# Patient Record
Sex: MALE | Race: WHITE | Employment: UNEMPLOYED | URBAN - METROPOLITAN AREA
[De-identification: names, ages, dates, MRNs, and addresses within clinical notes are randomized per-mention and may not be internally consistent; named-entity substitution may affect disease eponyms.]

---

## 2019-07-12 RX ORDER — SODIUM CHLORIDE 0.9 % (FLUSH) 0.9 %
5-40 SYRINGE (ML) INJECTION AS NEEDED
Status: CANCELLED | OUTPATIENT
Start: 2019-07-12

## 2019-07-12 RX ORDER — SODIUM CHLORIDE 0.9 % (FLUSH) 0.9 %
5-40 SYRINGE (ML) INJECTION EVERY 8 HOURS
Status: CANCELLED | OUTPATIENT
Start: 2019-07-12

## 2019-07-25 ENCOUNTER — ANESTHESIA EVENT (OUTPATIENT)
Dept: SURGERY | Age: 64
End: 2019-07-25
Payer: MEDICARE

## 2019-07-26 ENCOUNTER — ANESTHESIA (OUTPATIENT)
Dept: SURGERY | Age: 64
End: 2019-07-26
Payer: MEDICARE

## 2019-07-26 ENCOUNTER — HOSPITAL ENCOUNTER (OUTPATIENT)
Age: 64
Setting detail: OUTPATIENT SURGERY
Discharge: HOME | End: 2019-07-26
Attending: ORTHOPAEDIC SURGERY | Admitting: ORTHOPAEDIC SURGERY
Payer: MEDICARE

## 2019-07-26 VITALS
RESPIRATION RATE: 16 BRPM | SYSTOLIC BLOOD PRESSURE: 138 MMHG | TEMPERATURE: 98 F | DIASTOLIC BLOOD PRESSURE: 88 MMHG | HEART RATE: 78 BPM | BODY MASS INDEX: 27.98 KG/M2 | HEIGHT: 74 IN | OXYGEN SATURATION: 96 % | WEIGHT: 218 LBS

## 2019-07-26 LAB — GLUCOSE BLD STRIP.AUTO-MCNC: 186 MG/DL (ref 65–100)

## 2019-07-26 PROCEDURE — 74011250636 HC RX REV CODE- 250/636

## 2019-07-26 PROCEDURE — 74011250637 HC RX REV CODE- 250/637: Performed by: ANESTHESIOLOGY

## 2019-07-26 PROCEDURE — 82962 GLUCOSE BLOOD TEST: CPT

## 2019-07-26 PROCEDURE — 74011000250 HC RX REV CODE- 250

## 2019-07-26 PROCEDURE — 77030006668 HC BLD SHV MENSCS STRY -B: Performed by: ORTHOPAEDIC SURGERY

## 2019-07-26 PROCEDURE — 76060000032 HC ANESTHESIA 0.5 TO 1 HR: Performed by: ORTHOPAEDIC SURGERY

## 2019-07-26 PROCEDURE — 77030018836 HC SOL IRR NACL ICUM -A: Performed by: ORTHOPAEDIC SURGERY

## 2019-07-26 PROCEDURE — 74011250636 HC RX REV CODE- 250/636: Performed by: ANESTHESIOLOGY

## 2019-07-26 PROCEDURE — 76210000020 HC REC RM PH II FIRST 0.5 HR: Performed by: ORTHOPAEDIC SURGERY

## 2019-07-26 PROCEDURE — 76210000063 HC OR PH I REC FIRST 0.5 HR: Performed by: ORTHOPAEDIC SURGERY

## 2019-07-26 PROCEDURE — 74011250636 HC RX REV CODE- 250/636: Performed by: ORTHOPAEDIC SURGERY

## 2019-07-26 PROCEDURE — 76010000138 HC OR TIME 0.5 TO 1 HR: Performed by: ORTHOPAEDIC SURGERY

## 2019-07-26 PROCEDURE — 77030038012 HC WND COBLATN S&N -F: Performed by: ORTHOPAEDIC SURGERY

## 2019-07-26 PROCEDURE — 77030010509 HC AIRWY LMA MSK TELE -A: Performed by: NURSE ANESTHETIST, CERTIFIED REGISTERED

## 2019-07-26 RX ORDER — ALBUTEROL SULFATE 0.83 MG/ML
2.5 SOLUTION RESPIRATORY (INHALATION) AS NEEDED
Status: DISCONTINUED | OUTPATIENT
Start: 2019-07-26 | End: 2019-07-26 | Stop reason: HOSPADM

## 2019-07-26 RX ORDER — HYDROMORPHONE HYDROCHLORIDE 2 MG/ML
0.5 INJECTION, SOLUTION INTRAMUSCULAR; INTRAVENOUS; SUBCUTANEOUS
Status: DISCONTINUED | OUTPATIENT
Start: 2019-07-26 | End: 2019-07-26 | Stop reason: HOSPADM

## 2019-07-26 RX ORDER — PROPOFOL 10 MG/ML
INJECTION, EMULSION INTRAVENOUS AS NEEDED
Status: DISCONTINUED | OUTPATIENT
Start: 2019-07-26 | End: 2019-07-26 | Stop reason: HOSPADM

## 2019-07-26 RX ORDER — FENTANYL CITRATE 50 UG/ML
INJECTION, SOLUTION INTRAMUSCULAR; INTRAVENOUS AS NEEDED
Status: DISCONTINUED | OUTPATIENT
Start: 2019-07-26 | End: 2019-07-26 | Stop reason: HOSPADM

## 2019-07-26 RX ORDER — ONDANSETRON 2 MG/ML
4 INJECTION INTRAMUSCULAR; INTRAVENOUS
Status: DISCONTINUED | OUTPATIENT
Start: 2019-07-26 | End: 2019-07-26 | Stop reason: HOSPADM

## 2019-07-26 RX ORDER — ACETAMINOPHEN 500 MG
1000 TABLET ORAL ONCE
Status: COMPLETED | OUTPATIENT
Start: 2019-07-26 | End: 2019-07-26

## 2019-07-26 RX ORDER — MIDAZOLAM HYDROCHLORIDE 1 MG/ML
2 INJECTION, SOLUTION INTRAMUSCULAR; INTRAVENOUS
Status: DISCONTINUED | OUTPATIENT
Start: 2019-07-26 | End: 2019-07-26 | Stop reason: HOSPADM

## 2019-07-26 RX ORDER — SODIUM CHLORIDE 0.9 % (FLUSH) 0.9 %
5-40 SYRINGE (ML) INJECTION EVERY 8 HOURS
Status: DISCONTINUED | OUTPATIENT
Start: 2019-07-26 | End: 2019-07-26 | Stop reason: HOSPADM

## 2019-07-26 RX ORDER — ROPIVACAINE HYDROCHLORIDE 5 MG/ML
INJECTION, SOLUTION EPIDURAL; INFILTRATION; PERINEURAL AS NEEDED
Status: DISCONTINUED | OUTPATIENT
Start: 2019-07-26 | End: 2019-07-26 | Stop reason: HOSPADM

## 2019-07-26 RX ORDER — SODIUM CHLORIDE, SODIUM LACTATE, POTASSIUM CHLORIDE, CALCIUM CHLORIDE 600; 310; 30; 20 MG/100ML; MG/100ML; MG/100ML; MG/100ML
1000 INJECTION, SOLUTION INTRAVENOUS CONTINUOUS
Status: DISCONTINUED | OUTPATIENT
Start: 2019-07-26 | End: 2019-07-26 | Stop reason: HOSPADM

## 2019-07-26 RX ORDER — ONDANSETRON 2 MG/ML
INJECTION INTRAMUSCULAR; INTRAVENOUS AS NEEDED
Status: DISCONTINUED | OUTPATIENT
Start: 2019-07-26 | End: 2019-07-26 | Stop reason: HOSPADM

## 2019-07-26 RX ORDER — KETOROLAC TROMETHAMINE 30 MG/ML
INJECTION, SOLUTION INTRAMUSCULAR; INTRAVENOUS AS NEEDED
Status: DISCONTINUED | OUTPATIENT
Start: 2019-07-26 | End: 2019-07-26 | Stop reason: HOSPADM

## 2019-07-26 RX ORDER — LIDOCAINE HYDROCHLORIDE 20 MG/ML
INJECTION, SOLUTION EPIDURAL; INFILTRATION; INTRACAUDAL; PERINEURAL AS NEEDED
Status: DISCONTINUED | OUTPATIENT
Start: 2019-07-26 | End: 2019-07-26 | Stop reason: HOSPADM

## 2019-07-26 RX ORDER — LIDOCAINE HYDROCHLORIDE 10 MG/ML
0.1 INJECTION INFILTRATION; PERINEURAL AS NEEDED
Status: DISCONTINUED | OUTPATIENT
Start: 2019-07-26 | End: 2019-07-26 | Stop reason: HOSPADM

## 2019-07-26 RX ORDER — SODIUM CHLORIDE 0.9 % (FLUSH) 0.9 %
5-40 SYRINGE (ML) INJECTION AS NEEDED
Status: DISCONTINUED | OUTPATIENT
Start: 2019-07-26 | End: 2019-07-26 | Stop reason: HOSPADM

## 2019-07-26 RX ORDER — DEXAMETHASONE SODIUM PHOSPHATE 4 MG/ML
INJECTION, SOLUTION INTRA-ARTICULAR; INTRALESIONAL; INTRAMUSCULAR; INTRAVENOUS; SOFT TISSUE AS NEEDED
Status: DISCONTINUED | OUTPATIENT
Start: 2019-07-26 | End: 2019-07-26 | Stop reason: HOSPADM

## 2019-07-26 RX ORDER — EPHEDRINE SULFATE 50 MG/ML
INJECTION, SOLUTION INTRAVENOUS AS NEEDED
Status: DISCONTINUED | OUTPATIENT
Start: 2019-07-26 | End: 2019-07-26 | Stop reason: HOSPADM

## 2019-07-26 RX ADMIN — SODIUM CHLORIDE, SODIUM LACTATE, POTASSIUM CHLORIDE, AND CALCIUM CHLORIDE 1000 ML: 600; 310; 30; 20 INJECTION, SOLUTION INTRAVENOUS at 10:40

## 2019-07-26 RX ADMIN — ACETAMINOPHEN 1000 MG: 500 TABLET, FILM COATED ORAL at 10:44

## 2019-07-26 RX ADMIN — LIDOCAINE HYDROCHLORIDE 100 MG: 20 INJECTION, SOLUTION EPIDURAL; INFILTRATION; INTRACAUDAL; PERINEURAL at 11:47

## 2019-07-26 RX ADMIN — KETOROLAC TROMETHAMINE 30 MG: 30 INJECTION, SOLUTION INTRAMUSCULAR; INTRAVENOUS at 12:10

## 2019-07-26 RX ADMIN — PROPOFOL 200 MG: 10 INJECTION, EMULSION INTRAVENOUS at 11:47

## 2019-07-26 RX ADMIN — DEXAMETHASONE SODIUM PHOSPHATE 4 MG: 4 INJECTION, SOLUTION INTRA-ARTICULAR; INTRALESIONAL; INTRAMUSCULAR; INTRAVENOUS; SOFT TISSUE at 11:56

## 2019-07-26 RX ADMIN — ONDANSETRON 4 MG: 2 INJECTION INTRAMUSCULAR; INTRAVENOUS at 12:05

## 2019-07-26 RX ADMIN — EPHEDRINE SULFATE 10 MG: 50 INJECTION, SOLUTION INTRAVENOUS at 11:50

## 2019-07-26 RX ADMIN — FENTANYL CITRATE 50 MCG: 50 INJECTION, SOLUTION INTRAMUSCULAR; INTRAVENOUS at 11:47

## 2019-07-26 RX ADMIN — FENTANYL CITRATE 50 MCG: 50 INJECTION, SOLUTION INTRAMUSCULAR; INTRAVENOUS at 11:59

## 2019-07-26 NOTE — BRIEF OP NOTE
BRIEF OPERATIVE NOTE    Date of Procedure: 2019   Preoperative Diagnosis: Complex tear of medial meniscus, current injury, left knee, initial encounter [S83.232A]  Postoperative Diagnosis: Complex tear of medial/Lateral meniscus, current injury, left knee, chondromalacia, initial encounter [S83.232A]    Procedure(s):  LEFT KNEE ARTHROSCOPY/ MEDIAL/lATERALMENISCECTOMY/CHONDROPLASTY  Surgeon(s) and Role:      Keyla Mendiola MD - Primary         Surgical Assistant:         Surgical Staff:  Circ-1: Anais SARABIA  Circ-2: Esequiel Edmondson RN  Scrub Tech-1: Herlinda Apgar  Scrub Tech-Relief: Emma Jamil  Event Time In Time Out   Incision Start 1200    Incision Close 1211      Anesthesia: General   Estimated Blood Loss:     Specimens: * No specimens in log *   Findings:      Complications:     Implants: * No implants in log *

## 2019-07-26 NOTE — ANESTHESIA PREPROCEDURE EVALUATION
Relevant Problems   No relevant active problems       Anesthetic History               Review of Systems / Medical History  Patient summary reviewed and pertinent labs reviewed    Pulmonary    COPD      Smoker         Neuro/Psych              Cardiovascular    Hypertension          CAD         GI/Hepatic/Renal     GERD           Endo/Other    Diabetes         Other Findings              Physical Exam    Airway  Mallampati: II  TM Distance: 4 - 6 cm  Neck ROM: normal range of motion   Mouth opening: Normal     Cardiovascular    Rhythm: regular  Rate: normal      Pertinent negatives: No murmur   Dental    Dentition: Full upper dentures     Pulmonary  Breath sounds clear to auscultation               Abdominal         Other Findings            Anesthetic Plan    ASA: 3  Anesthesia type: general          Induction: Intravenous  Anesthetic plan and risks discussed with: Patient      LMA

## 2019-07-26 NOTE — DISCHARGE INSTRUCTIONS
POST OPERATIVE INSTRUCTIONS FOR KNEE ARTHROSCOPY    1. Unless you receive other instructions, you may weight bear as tolerated      2. Apply ice to your knee for 20-30 minutes several times per day for the first 3 days and then as needed. Icing will decrease the amount of inflammation and is helpful after exercise    3. You may remove the dressings the following day and apply waterproof band aids. Some bleeding and drainage may persist for several days following  surgery. Waterproof band aids may be used while showering and avoid tub baths for two weeks. 4. You will be given pain medications and do not drive under the influence. Some patients take pain medication at night and antiinflammatory medication during day  when pain decreases. 5. You may be given Phenergan for nausea    6. You will receive a phone call from our office notifying you of your follow up visit    7. If any problems call [phone]    ACTIVITY  · As tolerated and as directed by your doctor. · Bathe or shower as directed by your doctor. DIET  · Clear liquids until no nausea or vomiting; then light diet for the first day. · Advance to regular diet on second day, unless your doctor orders otherwise. · If nausea and vomiting continues, call your doctor. PAIN  · Take pain medication as directed by your doctor. · Call your doctor if pain is NOT relieved by medication. · DO NOT take aspirin of blood thinners unless directed by your doctor. CALL YOUR DOCTOR IF   · Excessive bleeding that does not stop after holding pressure over the area  · Temperature of 101 degrees F or above  · Excessive redness, swelling or bruising, and/ or green or yellow, smelly discharge from incision    AFTER ANESTHESIA   · For the first 24 hours: DO NOT Drive, Drink alcoholic beverages, or Make important decisions. · Be aware of dizziness following anesthesia and while taking pain medication.          DISCHARGE SUMMARY from Nurse    PATIENT INSTRUCTIONS:    After general anesthesia or intravenous sedation, for 24 hours or while taking prescription Narcotics:  · Limit your activities  · Do not drive and operate hazardous machinery  · Do not make important personal or business decisions  · Do  not drink alcoholic beverages  · If you have not urinated within 8 hours after discharge, please contact your surgeon on call. *  Please give a list of your current medications to your Primary Care Provider. *  Please update this list whenever your medications are discontinued, doses are      changed, or new medications (including over-the-counter products) are added. *  Please carry medication information at all times in case of emergency situations. These are general instructions for a healthy lifestyle:    No smoking/ No tobacco products/ Avoid exposure to second hand smoke    Surgeon General's Warning:  Quitting smoking now greatly reduces serious risk to your health. Obesity, smoking, and sedentary lifestyle greatly increases your risk for illness    A healthy diet, regular physical exercise & weight monitoring are important for maintaining a healthy lifestyle    You may be retaining fluid if you have a history of heart failure or if you experience any of the following symptoms:  Weight gain of 3 pounds or more overnight or 5 pounds in a week, increased swelling in our hands or feet or shortness of breath while lying flat in bed. Please call your doctor as soon as you notice any of these symptoms; do not wait until your next office visit. Recognize signs and symptoms of STROKE:    F-face looks uneven    A-arms unable to move or move unevenly    S-speech slurred or non-existent    T-time-call 911 as soon as signs and symptoms begin-DO NOT go       Back to bed or wait to see if you get better-TIME IS BRAIN.

## 2019-07-26 NOTE — ANESTHESIA POSTPROCEDURE EVALUATION
Procedure(s):  LEFT KNEE ARTHROSCOPY/ MEDIAL/lATERALMENISCECTOMY/CHONDROPLASTY. general    Anesthesia Post Evaluation      Multimodal analgesia: multimodal analgesia used between 6 hours prior to anesthesia start to PACU discharge  Patient location during evaluation: bedside  Patient participation: complete - patient participated  Level of consciousness: awake and responsive to light touch  Pain management: adequate  Airway patency: patent  Anesthetic complications: no  Cardiovascular status: acceptable, hemodynamically stable, blood pressure returned to baseline and stable  Respiratory status: acceptable, unassisted, spontaneous ventilation and nonlabored ventilation  Hydration status: acceptable  Post anesthesia nausea and vomiting:  controlled      Vitals Value Taken Time   /75 7/26/2019 12:33 PM   Temp 36.6 °C (97.8 °F) 7/26/2019 12:24 PM   Pulse 70 7/26/2019 12:36 PM   Resp 16 7/26/2019 12:33 PM   SpO2 97 % 7/26/2019 12:36 PM   Vitals shown include unvalidated device data.

## 2019-07-26 NOTE — H&P
Outpatient Surgery History and Physical:  Ronaldo Santos was seen and examined. CHIEF COMPLAINT:    l knee . PE:   There were no vitals taken for this visit. Heart:   Regular rhythm      Lungs:  Are clear      Past Medical History: There are no active problems to display for this patient. Surgical History:   Past Surgical History:   Procedure Laterality Date    HX CERVICAL FUSION      2013    HX CORONARY STENT PLACEMENT  01/2015    HX CORONARY STENT PLACEMENT  ~3/2015    1 stent  (total of 2)    HX KNEE ARTHROSCOPY Left     x 2    HX KNEE REPLACEMENT Right 2012    HX ORTHOPAEDIC Left     elbow surgery    HX TONSILLECTOMY         Social History: Patient  reports that he quit smoking about 4 years ago. He has a 40.00 pack-year smoking history. He uses smokeless tobacco. He reports that he drinks about 1.0 standard drinks of alcohol per week. He reports that he does not use drugs. Family History:   Family History   Problem Relation Age of Onset   [employer]ia[employer] Cancer Mother     Stroke Father     Hypertension Father     Heart Disease Father     Heart Attack Father        Allergies: Reviewed per EMR  Allergies   Allergen Reactions    Pcn [Penicillins] Rash       Medications:    No current facility-administered medications on file prior to encounter. Current Outpatient Medications on File Prior to Encounter   Medication Sig    lisinopril (PRINIVIL, ZESTRIL) 20 mg tablet Take 20 mg by mouth nightly.  omeprazole (PRILOSEC) 20 mg capsule Take 20 mg by mouth daily.  aspirin 81 mg chewable tablet Take 81 mg by mouth nightly. The surgery is planned for the  knee. History and physical has been reviewed. The patient has been examined. There have been no significant clinical changes since the completion of the originally dated History and Physical.  Patient identified by surgeon; surgical site was confirmed by patient and surgeon. The patient is here today for outpatient surgery.  I have examined the patient, no changes are noted in the patient's medical status. Necessity for the procedure/care is still present and the history and physical above is current. See the office notes for the full long term history of the problem. Please see the recent office notes for the musculoskeletal examination.     Signed By: Lexi Coy MD     July 26, 2019 7:02 AM

## 2019-07-26 NOTE — OP NOTES
[facility]  OPERATIVE REPORT    Name:  Nell Colbert  MR#:  [id number]  :  1955  ACCOUNT #:  [de-identified]  DATE OF SERVICE:  2019    PREOPERATIVE DIAGNOSIS:  Left medial meniscus tear. POSTOPERATIVE DIAGNOSES:  1.  Large displaced tear of the left posterior medial meniscus. 2.  Small tear of the anterior horn of the lateral meniscus. 3.  Thickened hypertrophic medial synovial fibrotic plica. 4.  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 PROCEDURE PERFORMED:  1. Arthroscopic medial and lateral meniscectomy. 2.  Excision of thickened hypertrophic medial synovial plica. SURGEON:  Jorge Brown MD    ASSISTANT:  None. ANESTHESIA:  General.    COMPLICATIONS:  None. SPECIMENS REMOVED:  None. IMPLANTS:  None. ESTIMATED BLOOD LOSS:  Minimal.    PROCEDURE:  After an adequate level of general anesthesia was obtained, the patient's left leg was prepped and draped in usual sterile fashion. A tourniquet was used for the procedure. Photos made for the patient. Anteromedial and anterolateral portals were made. Findings revealed normal tracking of the patella. There was a large thickened hypertrophic fibrotic plica which was quite large, resected with the basket and shaver. He had mild chondromalacia of the patellofemoral joint. The gutters were clear. In the medial compartment, there was a very large tear of the posterior one-half of the medial meniscus from the middle third to the posterior horn. This was resected with the basket and the shaver with radiofrequency contoured back to a horizontal cleavage component. There was grade II chondromalacia of the medial femoral condyle but no eburnated bone. Some of the delaminating flaps were stabilized with radiofrequency. ACL and PCL were intact. The gutters were clear.   Lateral compartment revealed a small tear of the anterior horn of the lateral meniscus resected with the basket and the shaver, but the articular surface laterally looked good. The knee was copiously irrigated. Sterile dressings were applied.       Judge Moris MD      [id number]  D:  07/26/2019 12:19  T:  07/26/2019 12:29  JOB #:  [id number]

## 2019-08-23 ENCOUNTER — HOSPITAL ENCOUNTER (OUTPATIENT)
Dept: ULTRASOUND IMAGING | Age: 64
Discharge: HOME | End: 2019-08-23
Attending: ORTHOPAEDIC SURGERY
Payer: MEDICARE

## 2019-08-23 DIAGNOSIS — M79.662 PAIN OF LEFT CALF: ICD-10-CM

## 2019-08-23 DIAGNOSIS — M79.652 LEFT THIGH PAIN: ICD-10-CM

## 2019-08-23 PROCEDURE — 93971 EXTREMITY STUDY: CPT

## (undated) DEVICE — T-DRAPE,EXTREMITY,STERILE: Brand: MEDLINE

## (undated) DEVICE — STOCKINETTE,IMPERVIOUS,12X48,STERILE: Brand: MEDLINE

## (undated) DEVICE — SURGICAL PROCEDURE PACK BASIC ST FRANCIS

## (undated) DEVICE — INTENDED FOR TISSUE SEPARATION, AND OTHER PROCEDURES THAT REQUIRE A SHARP SURGICAL BLADE TO PUNCTURE OR CUT.: Brand: BARD-PARKER ® STAINLESS STEEL BLADES

## (undated) DEVICE — PADDING CAST W4INXL4YD ST COT COHESIVE HND TEARABLE SPEC

## (undated) DEVICE — SYR LR LCK 1ML GRAD NSAF 30ML --

## (undated) DEVICE — SET IRRIG L94IN DIA0.281IN L BOR W/ 2 N VENT SPIK 2 ON/OFF

## (undated) DEVICE — AMD ANTIMICROBIAL GAUZE SPONGES,12 PLY USP TYPE VII, 0.2% POLYHEXAMETHYLENE BIGUANIDE HCI (PHMB): Brand: CURITY

## (undated) DEVICE — SOLUTION IRRIG 3000ML 0.9% SOD CHL FLX CONT 0797208] ICU MEDICAL INC]

## (undated) DEVICE — STERILE HOOK LOCK LATEX FREE ELASTIC BANDAGE 6INX5YD: Brand: HOOK LOCK™

## (undated) DEVICE — XEROFORM OCCLUSIVE GAUZE STRIP OVERWRAP, 3% BISMUTH TRIBROMOPHENATE IN PETROLATUM BLEND: Brand: XEROFORM

## (undated) DEVICE — NEEDLE HYPO 18GA L1.5IN THN WALL PIVOTING SHLD BVL ORIENTED

## (undated) DEVICE — BLADE SHV CUT MENIS AGG + 4MM --

## (undated) DEVICE — (D)PREP SKN CHLRAPRP APPL 26ML -- CONVERT TO ITEM 371833

## (undated) DEVICE — SET IRRIG DST FLX M CONN

## (undated) DEVICE — BANDAGE COMPR SELF ADH 5 YDX6 IN TAN STRL PREMIERPRO LF

## (undated) DEVICE — WEREWOLF FLOW 50 COBLATION WAND: Brand: COBLATION

## (undated) DEVICE — 3M™ COBAN™ SELF-ADHERENT WRAP, 1586S, STERILE, 6 IN X 5 YD (15 CM X 4,5 M), 12 ROLLS/CASE: Brand: 3M™ COBAN™

## (undated) DEVICE — 2000CC GUARDIAN II: Brand: GUARDIAN